# Patient Record
Sex: FEMALE | Race: AMERICAN INDIAN OR ALASKA NATIVE
[De-identification: names, ages, dates, MRNs, and addresses within clinical notes are randomized per-mention and may not be internally consistent; named-entity substitution may affect disease eponyms.]

---

## 2020-01-09 ENCOUNTER — HOSPITAL ENCOUNTER (EMERGENCY)
Dept: HOSPITAL 5 - ED | Age: 25
Discharge: LEFT BEFORE DISCHARGE | End: 2020-01-09
Payer: SELF-PAY

## 2020-01-09 VITALS — SYSTOLIC BLOOD PRESSURE: 118 MMHG | DIASTOLIC BLOOD PRESSURE: 76 MMHG

## 2020-01-09 DIAGNOSIS — J02.9: Primary | ICD-10-CM

## 2020-01-09 DIAGNOSIS — Z53.21: ICD-10-CM

## 2020-01-09 NOTE — EMERGENCY DEPARTMENT REPORT
Chief Complaint: Sore Throat


Stated Complaint: MOUTH PAIN/THROAT SWELLING


Time Seen by Provider: 01/09/20 07:41





- HPI


History of Present Illness: 





Patient was seen by me for a medical triage only.  As I entered the room patient

was not there. Patient left before being examined, treated, or diagnosis.  

Patient was clearly instructed by triage RN that if patient leaves without being

fully evaluated and treated that the condition may be serious and/or life 

threatening if not treated.  RN has been notified to call patient back for 

further evulation and treatment.  Patient left without being seen.





- Exam


Vital Signs: 


                                   Vital Signs











  01/09/20





  04:29


 


Temperature 98.6 F


 


Pulse Rate 98 H


 


Respiratory 16





Rate 


 


Blood Pressure 118/76


 


O2 Sat by Pulse 96





Oximetry 











MSE screening note: 


Focused history and physical exam performed.


Due to findings the following was ordered:











ED Disposition for MSE


Disposition: Z-07 ELOPED


Condition: Undetermined